# Patient Record
(demographics unavailable — no encounter records)

---

## 2017-01-17 NOTE — PDOC
History of Present Illness





- General


Chief Complaint: Injury


Stated Complaint: LT HAND INJURY


Time Seen by Provider: 01/17/17 13:25


History Source: Patient


Exam Limitations: No Limitations





- History of Present Illness


Initial Comments: 





01/17/17 14:28


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


Occurred: reports: just prior to arrival


Severity: reports: mild


Pain Location: reports: upper extremity


Method of Injury: Yes: direct blow





Past History





- Past Medical History


Allergies/Adverse Reactions: 


 Allergies











Allergy/AdvReac Type Severity Reaction Status Date / Time


 


No Known Allergies Allergy   Verified 01/17/17 12:34











Home Medications: 


Ambulatory Orders





No Home Medications 1 tab PO ASDIR 07/17/12 








Other medical history: denies





- Immunization History


Td Vaccination:  (WITHIN 5 YRS)


Immunization Up to Date: Yes





- Psycho/Social/Smoking Cessation Hx


Anxiety: No


Suicidal Ideation: No


Smoking Status: No


Smoking History: Never smoked


Have you smoked in the past 12 months: No


Number of Cigarettes Smoked Daily: 0


Information on smoking cessation initiated: No


Hx Alcohol Use: No


Drug/Substance Use Hx: No





**Review of Systems





- Review of Systems


Constitutional: Yes: Malaise.  No: Chills, Fever


Respiratory: No: Symptoms reported, Cough


Cardiac (ROS): No: Symptoms Reported


Integumentary: Yes: Other (SMALL ABRASION TO FINGER)


Neurological: Yes: Other.  No: Symptoms reported, Numbness, Paresthesia





*Physical Exam





- Vital Signs


 Last Vital Signs











Temp Pulse Resp BP Pulse Ox


 


 98 F   60   20   139/89   99 


 


 01/17/17 12:34  01/17/17 12:34  01/17/17 12:34  01/17/17 12:34  01/17/17 12:34














- Physical Exam


General Appearance: Yes: Appropriately Dressed, Apparent Distress


HEENT: positive: TMs Normal, Pharynx Normal


Respiratory/Chest: negative: Lungs Clear


Extremity: positive: Other (FALK PUNCTURE WOUND < .5CM TO VOLAR SURFACE LEFT 

INDEX JUST PROX TO PIP; PIP NOT INVOLVED)





ED Treatment Course





- RADIOLOGY


Radiology Studies Ordered: 














 Category Date Time Status


 


 FINGER(S) LEFT [RAD] Stat Radiology  01/17/17 14:00 Completed














- Medications


Given in the ED: 


ED Medications














Discontinued Medications














Generic Name Dose Route Start Last Admin





  Trade Name Freq  PRN Reason Stop Dose Admin


 


Cephalexin HCl  500 mg 01/17/17 13:59 01/17/17 14:13





  Keflex -  PO 01/17/17 14:00  500 mg





  ONCE ONE   Administration


 


Diphtheria/Tetanus/Acell Pertussis  0.5 ml 01/17/17 14:00 01/17/17 14:13





  Boostrix -  IM 01/17/17 14:01  0.5 ml





  .ONCE ONE   Administration














Medical Decision Making





- Medical Decision Making





01/17/17 14:31


XRAY= NEGATIVE; TD AND KEFLE PROPHAXALIS





*DC/Admit/Observation/Transfer


Diagnosis at time of Disposition: 


Puncture wound of finger of left hand


Qualifiers:


 Encounter type: initial encounter Qualified Code(s): S61.239A - Puncture wound 

without foreign body of unspecified finger without damage to nail, initial 

encounter





- Discharge Dispostion


Disposition: HOME


Condition at time of disposition: Stable


Admit: No





- Patient Instructions


Additional Instructions: 


TETANUS GOOD X 10 YEARS; CLEAN DAILY; WOUND CHECK IN ED IN 2 DAYS





- Post Discharge Activity


Work/School Note:  Back to Work

## 2017-11-22 NOTE — PDOC
History of Present Illness





- General


Chief Complaint: Injury


Stated Complaint: TWISTED  RIGHT  ANKLE   TODAY


Time Seen by Provider: 11/22/17 20:11





- History of Present Illness


Initial Comments: 





11/22/17 20:47


"The patient is a 25 year old male who presents to the emergency department 

with R ankle pain. He reports that he twisted his ankle inwards while stacking 

logs. He believes he inverted it. Pt felt a pop afterwards and fell to the 

ground. However, he notes that he was able to put weight on the ankle 

immediately afterwards and was able to ambulate but reports that it has gotten 

harder to do so. He denies any other kind of injuries. Denies headstrike/LOC.





Allergies: None 


Past surgical history: None reported 


Social history: No alcohol, tobacco or drug use reported 


"





Past History





- Past Medical History


Allergies/Adverse Reactions: 


 Allergies











Allergy/AdvReac Type Severity Reaction Status Date / Time


 


No Known Allergies Allergy   Verified 11/22/17 20:15











Home Medications: 


Ambulatory Orders





NK [No Known Home Medication]  11/22/17 








COPD: No


Other medical history: DENIES





- Immunization History


Td Vaccination:  (WITHIN 5 YRS)


Immunization Up to Date: Yes





- Suicide/Smoking/Psychosocial Hx


Smoking Status: No


Smoking History: Never smoked


Have you smoked in the past 12 months: No


Number of Cigarettes Smoked Daily: 0


Information on smoking cessation initiated: No


Hx Alcohol Use: Yes (SOCIAL)


Drug/Substance Use Hx: No


Substance Use Type: Alcohol





**Review of Systems





- Review of Systems


Comments:: 





11/22/17 21:00


"GENERAL/CONSTITUTIONAL: No fever or chills. No weakness.


HEAD, EYES, EARS, NOSE AND THROAT: No change in vision. No ear pain or 

discharge. No sore throat.


CARDIOVASCULAR: No chest pain or shortness of breath.


RESPIRATORY: No cough, wheezing, or hemoptysis.


GASTROINTESTINAL: No nausea, vomiting, diarrhea or constipation.


GENITOURINARY: No dysuria, frequency, or change in urination.


MUSCULOSKELETAL: No joint or muscle swelling or pain. No neck or back pain.


EXTREMITIES: (+) Right ankle injury. 


SKIN: No rash


NEUROLOGIC: No headache, vertigo, loss of consciousness, or change in strength/

sensation.


ENDOCRINE: No increased thirst. No abnormal weight change.


HEMATOLOGIC/LYMPHATIC: No anemia, easy bleeding, or history of blood clots.


ALLERGIC/IMMUNOLOGIC: No hives or skin allergy.


"





*Physical Exam





- Vital Signs


 Last Vital Signs











Temp Pulse Resp BP Pulse Ox


 


 99 F   82   16   155/85   99 


 


 11/22/17 20:09  11/22/17 20:09  11/22/17 20:09  11/22/17 20:09  11/22/17 20:09














- Physical Exam


Comments: 





11/22/17 21:00


"GENERAL: Awake, alert, and fully oriented, in no acute distress


HEAD: No signs of trauma


EYES: PERRLA, EOMI, sclera anicteric, conjunctiva clear


ENT: Auricles normal inspection, hearing grossly normal, nares patent, 

oropharynx clear without exudates. Moist mucosa


NECK: Nontender, no stepoffs, Normal ROM, supple, no lymphadenopathy, JVD, or 

masses


LUNGS: Breath sounds equal, clear to auscultation bilaterally.  No wheezes, and 

no crackles


HEART: Regular rate and rhythm, normal S1 and S2, no murmurs, rubs or gallops


ABDOMEN: Soft, nontender, normoactive bowel sounds.  No guarding, no rebound.  

No masses


EXTREMITIES: R ankle with tenderness over lateral malleolus, + joint effusion, 

no obvious deformity, pt able to bear weight with some discomfort


NEUROLOGICAL: Cranial nerves II through XII intact. 5/5 strength and sensation 

in all extremities, Normal speech, normal gait


SKIN: Warm, Dry, normal turgor, no rashes or lesions noted.


"





ED Treatment Course





- RADIOLOGY


Radiology Studies Ordered: 














 Category Date Time Status


 


 ANKLE-RIGHT [RAD] Stat Radiology  11/22/17 20:22 Ordered














- Medications


Given in the ED: 


ED Medications














Discontinued Medications














Generic Name Dose Route Start Last Admin





  Trade Name Aleksandar  PRN Reason Stop Dose Admin


 


Ketorolac Tromethamine  15 mg  11/22/17 20:25  11/22/17 20:31





  Toradol Injection -  IM  11/22/17 20:26  15 mg





  ONCE ONE   Administration














Medical Decision Making





- Medical Decision Making





11/22/17 21:00


25 M with R ankle pain and swelling after twisting it today.


- XR R ankle


- Pain control





11/22/17 21:12


XR with no acute fx on my read.


Pt reassessed s/p toradol. Now ambulatory with normal gait. No ankle 

instability on exam.


Pt placed in ACE wrap. Crutches offered but pt declined.


Pt has his own orthopedic surgeon with whom he will follow up.





I discussed the physical exam findings, ancillary test results and final 

diagnoses with the patient. I answered all of the patient's questions. The 

patient was satisfied with the care received and felt comfortable with the 

discharge plan and treatment plan.  The patient agrees to follow up with the 

primary care physician within 24-72 hours.








*DC/Admit/Observation/Transfer


Diagnosis at time of Disposition: 


 Ankle sprain








- Discharge Dispostion


Disposition: HOME


Condition at time of disposition: Stable





- Referrals


Referrals: 


Dick Sandoval MD [Staff Physician] - 





- Patient Instructions


Printed Discharge Instructions:  DI for Ankle Sprain


Additional Instructions: 


Rest your ankle as much as possible. Apply ice and keep it elevated to reduce 

swelling. Take ibuprofen or aleve as needed for pain.


If you experience worsening pain, swelling, or any other concerning symptoms, 

return to the ER immediately. Otherwise, follow up with your orthopedist within 

1 week for a re-evaluation. If you do not have one, call the number provided to 

make an appointment.





- Post Discharge Activity





- Attestations


Physician Attestion: 





11/22/17 21:14








I, Dr. Dick Haynes MD, attest that this document has been prepared under my 

direction and personally reviewed by me in its entirety.   I further attest, 

that it accurately reflects all work, treatment, procedures and medical decision

-making performed by me.

## 2018-12-26 NOTE — PDOC
History of Present Illness





- General


Chief Complaint: Injury


Stated Complaint: INJURY


Time Seen by Provider: 12/26/18 12:35


History Source: Patient


Exam Limitations: Clinical Condition





- History of Present Illness


Initial Comments: 





12/26/18 12:55


Patient with no significant past medical history present with complaint of 

right ankle pain status post twisting on  curbside over an hour ago. Patient 

denies fall.


Timing/Duration: 1-3 hours





Past History





- Past Medical History


Allergies/Adverse Reactions: 


 Allergies











Allergy/AdvReac Type Severity Reaction Status Date / Time


 


No Known Allergies Allergy   Verified 12/26/18 12:06











Home Medications: 


Ambulatory Orders





Naproxen 500 mg PO BID PRN #20 tablet 12/26/18 








COPD: No





- Immunization History


Td Vaccination:  (WITHIN 5 YRS)


Immunization Up to Date: Yes





- Suicide/Smoking/Psychosocial Hx


Smoking Status: No


Smoking History: Never smoked


Have you smoked in the past 12 months: No


Number of Cigarettes Smoked Daily: 0


Information on smoking cessation initiated: No


Hx Alcohol Use: No


Drug/Substance Use Hx: No


Substance Use Type: Alcohol





**Review of Systems





- Review of Systems


Able to Perform ROS?: Yes


Is the patient limited English proficient: No


Constitutional: No: Weakness


HEENTM: No: Symptoms Reported


Respiratory: No: Symptoms reported


Cardiac (ROS): No: Symptoms Reported


ABD/GI: No: Symptoms Reported


Musculoskeletal: Yes: Joint Pain (right ankle pain), Muscle Pain (right ankle 

lateral side pain).  No: Muscle Weakness


All Other Systems: Reviewed and Negative





*Physical Exam





- Vital Signs


 Last Vital Signs











Temp Pulse Resp BP Pulse Ox


 


 97.3 F L  70   16   149/85   100 


 


 12/26/18 12:06  12/26/18 12:06  12/26/18 12:06  12/26/18 12:06  12/26/18 12:06














- Physical Exam


Comments: 





12/26/18 12:56


GENERAL:


Well developed, well nourished. Awake and alert. mild acute distress.


CARDIOVASCULAR:


Regular rate and rhythm. No murmurs, rubs, or gallops. 


PULMONARY: 


No evidence of respiratory distress. Lungs clear to auscultation bilaterally. 

No wheezing, rales or rhonchi.


ABDOMINAL:


Soft. Non-tender. Non-distended. No rebound or guarding. No organomegaly. 

Normoactive bowel sounds


MUSCULOSKELETAL : mild tenderness over lateral malleolus and dorsum of right 

ankle. No swelling to her ankle. No ecchymosis or bruising to ankle. No bony 

deformities 


SKIN: 


Warm and dry. Normal capillary refill. 


NEUROLOGICAL: 


Alert, awake, appropriate.  No motor deficits in the  lower extremities. 


PSYCHIATRIC: 


Cooperative. Good eye contact. Appropriate mood and affect.


General Appearance: Yes: Nourished, Appropriately Dressed, Mild Distress





Moderate Sedation





- Procedure Monitoring


Vital Signs: 


Procedure Monitoring Vital Signs











Temperature  97.3 F L  12/26/18 12:06


 


Pulse Rate  70   12/26/18 12:06


 


Respiratory Rate  16   12/26/18 12:06


 


Blood Pressure  149/85   12/26/18 12:06


 


O2 Sat by Pulse Oximetry (%)  100   12/26/18 12:06











ED Treatment Course





- RADIOLOGY


Radiology Studies Ordered: 














 Category Date Time Status


 


 ANKLE & FOOT-RIGHT* [RAD] Stat Radiology  12/26/18 12:50 Ordered














Medical Decision Making





- Medical Decision Making





12/26/18 12:58


Patient with no significant past medication present with complaint of right 

ankle pain status post twisting  right ankle on curbside over an hour ago.


Exam significant for mild tenderness to dorsal and lateral malleolus of right 

ankle. X-ray of right ankle and foot ordered


12/26/18 13:24


X-ray of right ankle and foot shows no acute pathology. Patient is stable for 

discharge with ankle support brace. Patient declined crutches. Naproxen 500 mg 

ordered as needed for pain. Orthopedist follow-up given.





*DC/Admit/Observation/Transfer


Diagnosis at time of Disposition: 


Ankle sprain


Qualifiers:


 Encounter type: initial encounter Involved ligament of ankle: unspecified 

ligament Laterality: right Qualified Code(s): S93.401A - Sprain of unspecified 

ligament of right ankle, initial encounter








- Discharge Dispostion


Disposition: HOME


Condition at time of disposition: Stable


Decision to Admit order: No





- Prescriptions


Prescriptions: 


Naproxen 500 mg PO BID PRN #20 tablet


 PRN Reason: ankle pain





- Referrals


Referrals: 


Richard Munroe MD [Staff Physician] - 





- Patient Instructions


Printed Discharge Instructions:  DI for Ankle Sprain


Additional Instructions: 


Take medication as prescribed. Keep weight off right ankle for next 3 days 

after which you can ambulate as tolerated. Elevate right ankle for the next 3 

days and apply warm compresses to ankle 2-3 times a day for 5-10 minutes. Follow

-up referred orthopedics if symptoms persist for more than 4 days.





- Post Discharge Activity


Forms/Work/School Notes:  Back to Work